# Patient Record
Sex: FEMALE | NOT HISPANIC OR LATINO
[De-identification: names, ages, dates, MRNs, and addresses within clinical notes are randomized per-mention and may not be internally consistent; named-entity substitution may affect disease eponyms.]

---

## 2024-04-01 PROBLEM — Z00.00 ENCOUNTER FOR PREVENTIVE HEALTH EXAMINATION: Status: ACTIVE | Noted: 2024-04-01

## 2024-04-09 ENCOUNTER — NON-APPOINTMENT (OUTPATIENT)
Age: 29
End: 2024-04-09

## 2024-04-10 ENCOUNTER — APPOINTMENT (OUTPATIENT)
Dept: OBGYN | Facility: CLINIC | Age: 29
End: 2024-04-10
Payer: COMMERCIAL

## 2024-04-10 VITALS
SYSTOLIC BLOOD PRESSURE: 131 MMHG | BODY MASS INDEX: 28.55 KG/M2 | DIASTOLIC BLOOD PRESSURE: 77 MMHG | HEIGHT: 63 IN | WEIGHT: 161.13 LBS

## 2024-04-10 DIAGNOSIS — N64.59 OTHER SIGNS AND SYMPTOMS IN BREAST: ICD-10-CM

## 2024-04-10 DIAGNOSIS — O35.EXX0 MATERNAL CARE FOR OTHER (SUSPECTED) FETAL ABNORMALITY AND DAMAGE, FETAL GENITOURINARY ANOMALIES, NOT APPLICABLE OR UNSPECIFIED: ICD-10-CM

## 2024-04-10 DIAGNOSIS — Z34.92 ENCOUNTER FOR SUPERVISION OF NORMAL PREGNANCY, UNSPECIFIED, SECOND TRIMESTER: ICD-10-CM

## 2024-04-10 LAB — TSH SERPL-ACNC: 1.26 UIU/ML

## 2024-04-10 PROCEDURE — 0500F INITIAL PRENATAL CARE VISIT: CPT

## 2024-04-11 LAB
ABO + RH PNL BLD: NORMAL
BASOPHILS # BLD AUTO: 0.04 K/UL
BASOPHILS NFR BLD AUTO: 0.5 %
BLD GP AB SCN SERPL QL: NORMAL
C TRACH RRNA SPEC QL NAA+PROBE: NOT DETECTED
EOSINOPHIL # BLD AUTO: 0.11 K/UL
EOSINOPHIL NFR BLD AUTO: 1.2 %
HBV SURFACE AG SER QL: NONREACTIVE
HCT VFR BLD CALC: 40.7 %
HGB A MFR BLD: 96.9 %
HGB A2 MFR BLD: 2.7 %
HGB BLD-MCNC: 12.5 G/DL
HGB F MFR BLD: 0.4 %
HGB FRACT BLD-IMP: NORMAL
HIV1+2 AB SPEC QL IA.RAPID: NONREACTIVE
IMM GRANULOCYTES NFR BLD AUTO: 0.5 %
LYMPHOCYTES # BLD AUTO: 1.5 K/UL
LYMPHOCYTES NFR BLD AUTO: 16.9 %
MAN DIFF?: NORMAL
MCHC RBC-ENTMCNC: 29.8 PG
MCHC RBC-ENTMCNC: 30.7 GM/DL
MCV RBC AUTO: 97.1 FL
MEV IGG FLD QL IA: 44.8 AU/ML
MEV IGG+IGM SER-IMP: POSITIVE
MONOCYTES # BLD AUTO: 0.54 K/UL
MONOCYTES NFR BLD AUTO: 6.1 %
N GONORRHOEA RRNA SPEC QL NAA+PROBE: NOT DETECTED
NEUTROPHILS # BLD AUTO: 6.62 K/UL
NEUTROPHILS NFR BLD AUTO: 74.8 %
PLATELET # BLD AUTO: 276 K/UL
RBC # BLD: 4.19 M/UL
RBC # FLD: 13.5 %
RUBV IGG FLD-ACNC: 1.4 INDEX
RUBV IGG SER-IMP: POSITIVE
SOURCE AMPLIFICATION: NORMAL
T PALLIDUM AB SER QL IA: NEGATIVE
VZV AB TITR SER: POSITIVE
VZV IGG SER IF-ACNC: >4000 INDEX
WBC # FLD AUTO: 8.85 K/UL

## 2024-04-13 LAB — BACTERIA UR CULT: ABNORMAL

## 2024-04-16 LAB — LEAD BLD-MCNC: <1 UG/DL

## 2024-04-29 ENCOUNTER — TRANSCRIPTION ENCOUNTER (OUTPATIENT)
Age: 29
End: 2024-04-29

## 2024-04-30 ENCOUNTER — NON-APPOINTMENT (OUTPATIENT)
Age: 29
End: 2024-04-30

## 2024-04-30 DIAGNOSIS — R73.09 OTHER ABNORMAL GLUCOSE: ICD-10-CM

## 2024-04-30 LAB — GLUCOSE 1H P 50 G GLC PO SERPL-MCNC: 145 MG/DL

## 2024-05-06 ENCOUNTER — APPOINTMENT (OUTPATIENT)
Dept: OBGYN | Facility: CLINIC | Age: 29
End: 2024-05-06
Payer: COMMERCIAL

## 2024-05-06 VITALS
SYSTOLIC BLOOD PRESSURE: 111 MMHG | WEIGHT: 162.38 LBS | DIASTOLIC BLOOD PRESSURE: 74 MMHG | BODY MASS INDEX: 28.76 KG/M2

## 2024-05-06 PROCEDURE — 90471 IMMUNIZATION ADMIN: CPT

## 2024-05-06 PROCEDURE — 0502F SUBSEQUENT PRENATAL CARE: CPT

## 2024-05-06 PROCEDURE — 90715 TDAP VACCINE 7 YRS/> IM: CPT

## 2024-05-13 ENCOUNTER — APPOINTMENT (OUTPATIENT)
Dept: ANTEPARTUM | Facility: CLINIC | Age: 29
End: 2024-05-13
Payer: COMMERCIAL

## 2024-05-13 ENCOUNTER — ASOB RESULT (OUTPATIENT)
Age: 29
End: 2024-05-13

## 2024-05-13 PROCEDURE — 76805 OB US >/= 14 WKS SNGL FETUS: CPT

## 2024-05-14 ENCOUNTER — NON-APPOINTMENT (OUTPATIENT)
Age: 29
End: 2024-05-14

## 2024-05-17 ENCOUNTER — ASOB RESULT (OUTPATIENT)
Age: 29
End: 2024-05-17

## 2024-05-17 ENCOUNTER — APPOINTMENT (OUTPATIENT)
Dept: MATERNAL FETAL MEDICINE | Facility: CLINIC | Age: 29
End: 2024-05-17
Payer: COMMERCIAL

## 2024-05-17 PROCEDURE — G0109 DIAB MANAGE TRN IND/GROUP: CPT | Mod: 95

## 2024-05-17 RX ORDER — CHOLECALCIFEROL (VITAMIN D3) 10(400)/ML
DROPS ORAL
Qty: 400 | Refills: 2 | Status: ACTIVE | COMMUNITY
Start: 2024-05-17 | End: 1900-01-01

## 2024-05-17 RX ORDER — BLOOD-GLUCOSE METER
W/DEVICE KIT MISCELLANEOUS
Qty: 1 | Refills: 0 | Status: ACTIVE | COMMUNITY
Start: 2024-05-17 | End: 1900-01-01

## 2024-05-17 RX ORDER — BLOOD-GLUCOSE METER
KIT MISCELLANEOUS 4 TIMES DAILY
Qty: 2 | Refills: 2 | Status: ACTIVE | COMMUNITY
Start: 2024-05-17 | End: 1900-01-01

## 2024-05-17 RX ORDER — URINE ACETONE TEST STRIPS
STRIP MISCELLANEOUS
Qty: 1 | Refills: 0 | Status: ACTIVE | COMMUNITY
Start: 2024-05-17 | End: 1900-01-01

## 2024-05-20 ENCOUNTER — NON-APPOINTMENT (OUTPATIENT)
Age: 29
End: 2024-05-20

## 2024-05-21 ENCOUNTER — APPOINTMENT (OUTPATIENT)
Dept: OBGYN | Facility: CLINIC | Age: 29
End: 2024-05-21
Payer: COMMERCIAL

## 2024-05-21 VITALS
DIASTOLIC BLOOD PRESSURE: 71 MMHG | WEIGHT: 162.25 LBS | SYSTOLIC BLOOD PRESSURE: 106 MMHG | BODY MASS INDEX: 28.74 KG/M2

## 2024-05-21 PROCEDURE — 0502F SUBSEQUENT PRENATAL CARE: CPT

## 2024-05-24 ENCOUNTER — APPOINTMENT (OUTPATIENT)
Dept: MATERNAL FETAL MEDICINE | Facility: CLINIC | Age: 29
End: 2024-05-24
Payer: COMMERCIAL

## 2024-05-24 ENCOUNTER — ASOB RESULT (OUTPATIENT)
Age: 29
End: 2024-05-24

## 2024-05-24 PROCEDURE — G0108 DIAB MANAGE TRN  PER INDIV: CPT | Mod: 95

## 2024-05-28 RX ORDER — ELECTROLYTES/DEXTROSE
32G X 4 MM SOLUTION, ORAL ORAL
Qty: 1 | Refills: 0 | Status: ACTIVE | COMMUNITY
Start: 2024-05-24 | End: 1900-01-01

## 2024-05-28 RX ORDER — INSULIN HUMAN 100 [IU]/ML
100 INJECTION, SUSPENSION SUBCUTANEOUS
Qty: 1 | Refills: 0 | Status: ACTIVE | COMMUNITY
Start: 2024-05-24 | End: 1900-01-01

## 2024-05-28 RX ORDER — ISOPROPYL ALCOHOL 0.7 ML/ML
SWAB TOPICAL
Qty: 1 | Refills: 0 | Status: ACTIVE | COMMUNITY
Start: 2024-05-24 | End: 1900-01-01

## 2024-05-30 ENCOUNTER — NON-APPOINTMENT (OUTPATIENT)
Age: 29
End: 2024-05-30

## 2024-06-06 ENCOUNTER — NON-APPOINTMENT (OUTPATIENT)
Age: 29
End: 2024-06-06

## 2024-06-06 ENCOUNTER — APPOINTMENT (OUTPATIENT)
Dept: MATERNAL FETAL MEDICINE | Facility: CLINIC | Age: 29
End: 2024-06-06
Payer: COMMERCIAL

## 2024-06-06 ENCOUNTER — APPOINTMENT (OUTPATIENT)
Dept: OBGYN | Facility: CLINIC | Age: 29
End: 2024-06-06
Payer: COMMERCIAL

## 2024-06-06 ENCOUNTER — ASOB RESULT (OUTPATIENT)
Age: 29
End: 2024-06-06

## 2024-06-06 VITALS
DIASTOLIC BLOOD PRESSURE: 71 MMHG | WEIGHT: 163.5 LBS | SYSTOLIC BLOOD PRESSURE: 107 MMHG | BODY MASS INDEX: 28.97 KG/M2 | HEIGHT: 63 IN

## 2024-06-06 DIAGNOSIS — O24.419 GESTATIONAL DIABETES MELLITUS IN PREGNANCY, UNSPECIFIED CONTROL: ICD-10-CM

## 2024-06-06 PROCEDURE — G0108 DIAB MANAGE TRN  PER INDIV: CPT | Mod: 95

## 2024-06-06 PROCEDURE — 98960 EDU&TRN PT SELF-MGMT NQHP 1: CPT | Mod: 95

## 2024-06-06 PROCEDURE — 0502F SUBSEQUENT PRENATAL CARE: CPT

## 2024-06-06 RX ORDER — BLOOD-GLUCOSE,RECEIVER,CONT
EACH MISCELLANEOUS
Qty: 1 | Refills: 0 | Status: ACTIVE | COMMUNITY
Start: 2024-06-06 | End: 1900-01-01

## 2024-06-06 RX ORDER — BLOOD-GLUCOSE SENSOR
EACH MISCELLANEOUS
Qty: 3 | Refills: 3 | Status: ACTIVE | COMMUNITY
Start: 2024-06-06 | End: 1900-01-01

## 2024-06-12 ENCOUNTER — ASOB RESULT (OUTPATIENT)
Age: 29
End: 2024-06-12

## 2024-06-12 ENCOUNTER — APPOINTMENT (OUTPATIENT)
Dept: ANTEPARTUM | Facility: CLINIC | Age: 29
End: 2024-06-12
Payer: COMMERCIAL

## 2024-06-12 PROCEDURE — 76816 OB US FOLLOW-UP PER FETUS: CPT

## 2024-06-12 PROCEDURE — 76819 FETAL BIOPHYS PROFIL W/O NST: CPT | Mod: 59

## 2024-06-18 ENCOUNTER — NON-APPOINTMENT (OUTPATIENT)
Age: 29
End: 2024-06-18

## 2024-06-19 ENCOUNTER — APPOINTMENT (OUTPATIENT)
Dept: OBGYN | Facility: CLINIC | Age: 29
End: 2024-06-19
Payer: COMMERCIAL

## 2024-06-19 VITALS
SYSTOLIC BLOOD PRESSURE: 111 MMHG | DIASTOLIC BLOOD PRESSURE: 79 MMHG | HEIGHT: 63 IN | WEIGHT: 165.5 LBS | BODY MASS INDEX: 29.32 KG/M2

## 2024-06-19 DIAGNOSIS — Z34.93 ENCOUNTER FOR SUPERVISION OF NORMAL PREGNANCY, UNSPECIFIED, THIRD TRIMESTER: ICD-10-CM

## 2024-06-19 PROCEDURE — 0502F SUBSEQUENT PRENATAL CARE: CPT

## 2024-06-20 ENCOUNTER — ASOB RESULT (OUTPATIENT)
Age: 29
End: 2024-06-20

## 2024-06-20 ENCOUNTER — APPOINTMENT (OUTPATIENT)
Dept: MATERNAL FETAL MEDICINE | Facility: CLINIC | Age: 29
End: 2024-06-20
Payer: COMMERCIAL

## 2024-06-20 PROCEDURE — 98960 EDU&TRN PT SELF-MGMT NQHP 1: CPT | Mod: 95

## 2024-06-20 PROCEDURE — G0108 DIAB MANAGE TRN  PER INDIV: CPT | Mod: 95

## 2024-07-02 ENCOUNTER — NON-APPOINTMENT (OUTPATIENT)
Age: 29
End: 2024-07-02

## 2024-07-02 ENCOUNTER — ASOB RESULT (OUTPATIENT)
Age: 29
End: 2024-07-02

## 2024-07-02 ENCOUNTER — APPOINTMENT (OUTPATIENT)
Dept: ANTEPARTUM | Facility: CLINIC | Age: 29
End: 2024-07-02
Payer: COMMERCIAL

## 2024-07-02 ENCOUNTER — APPOINTMENT (OUTPATIENT)
Dept: OBGYN | Facility: CLINIC | Age: 29
End: 2024-07-02
Payer: COMMERCIAL

## 2024-07-02 VITALS
HEIGHT: 63 IN | SYSTOLIC BLOOD PRESSURE: 109 MMHG | DIASTOLIC BLOOD PRESSURE: 75 MMHG | BODY MASS INDEX: 29.44 KG/M2 | WEIGHT: 166.13 LBS

## 2024-07-02 PROCEDURE — 81003 URINALYSIS AUTO W/O SCOPE: CPT | Mod: NC,QW

## 2024-07-02 PROCEDURE — 76819 FETAL BIOPHYS PROFIL W/O NST: CPT | Mod: 59

## 2024-07-02 PROCEDURE — 76816 OB US FOLLOW-UP PER FETUS: CPT

## 2024-07-02 PROCEDURE — 0502F SUBSEQUENT PRENATAL CARE: CPT

## 2024-07-05 ENCOUNTER — ASOB RESULT (OUTPATIENT)
Age: 29
End: 2024-07-05

## 2024-07-05 ENCOUNTER — APPOINTMENT (OUTPATIENT)
Dept: MATERNAL FETAL MEDICINE | Facility: CLINIC | Age: 29
End: 2024-07-05
Payer: COMMERCIAL

## 2024-07-05 LAB
GP B STREP DNA SPEC QL NAA+PROBE: NOT DETECTED
SOURCE GBS: NORMAL

## 2024-07-05 PROCEDURE — G0108 DIAB MANAGE TRN  PER INDIV: CPT | Mod: 95

## 2024-07-05 PROCEDURE — 98960 EDU&TRN PT SELF-MGMT NQHP 1: CPT | Mod: 95

## 2024-07-09 ENCOUNTER — APPOINTMENT (OUTPATIENT)
Dept: OBGYN | Facility: CLINIC | Age: 29
End: 2024-07-09
Payer: COMMERCIAL

## 2024-07-09 ENCOUNTER — NON-APPOINTMENT (OUTPATIENT)
Age: 29
End: 2024-07-09

## 2024-07-09 DIAGNOSIS — Z34.93 ENCOUNTER FOR SUPERVISION OF NORMAL PREGNANCY, UNSPECIFIED, THIRD TRIMESTER: ICD-10-CM

## 2024-07-09 PROCEDURE — 0502F SUBSEQUENT PRENATAL CARE: CPT

## 2024-07-16 ENCOUNTER — LABORATORY RESULT (OUTPATIENT)
Age: 29
End: 2024-07-16

## 2024-07-16 ENCOUNTER — APPOINTMENT (OUTPATIENT)
Dept: OBGYN | Facility: CLINIC | Age: 29
End: 2024-07-16
Payer: COMMERCIAL

## 2024-07-16 VITALS — DIASTOLIC BLOOD PRESSURE: 70 MMHG | WEIGHT: 163.8 LBS | SYSTOLIC BLOOD PRESSURE: 112 MMHG | BODY MASS INDEX: 29.02 KG/M2

## 2024-07-16 DIAGNOSIS — S03.40XA SPRAIN OF JAW, UNSPECIFIED SIDE, INITIAL ENCOUNTER: ICD-10-CM

## 2024-07-16 PROCEDURE — 0502F SUBSEQUENT PRENATAL CARE: CPT

## 2024-07-19 ENCOUNTER — NON-APPOINTMENT (OUTPATIENT)
Age: 29
End: 2024-07-19

## 2024-07-22 ENCOUNTER — ASOB RESULT (OUTPATIENT)
Age: 29
End: 2024-07-22

## 2024-07-22 ENCOUNTER — APPOINTMENT (OUTPATIENT)
Dept: ANTEPARTUM | Facility: CLINIC | Age: 29
End: 2024-07-22
Payer: COMMERCIAL

## 2024-07-22 ENCOUNTER — APPOINTMENT (OUTPATIENT)
Dept: OBGYN | Facility: CLINIC | Age: 29
End: 2024-07-22
Payer: COMMERCIAL

## 2024-07-22 VITALS
BODY MASS INDEX: 29.26 KG/M2 | HEIGHT: 63 IN | WEIGHT: 165.13 LBS | DIASTOLIC BLOOD PRESSURE: 62 MMHG | SYSTOLIC BLOOD PRESSURE: 103 MMHG

## 2024-07-22 PROCEDURE — 76816 OB US FOLLOW-UP PER FETUS: CPT

## 2024-07-22 PROCEDURE — 0502F SUBSEQUENT PRENATAL CARE: CPT

## 2024-07-26 ENCOUNTER — NON-APPOINTMENT (OUTPATIENT)
Age: 29
End: 2024-07-26

## 2024-07-28 ENCOUNTER — INPATIENT (INPATIENT)
Facility: HOSPITAL | Age: 29
LOS: 3 days | Discharge: HOME | DRG: 951 | End: 2024-08-01
Attending: OBSTETRICS & GYNECOLOGY | Admitting: OBSTETRICS & GYNECOLOGY
Payer: COMMERCIAL

## 2024-07-28 VITALS
TEMPERATURE: 99 F | OXYGEN SATURATION: 97 % | SYSTOLIC BLOOD PRESSURE: 122 MMHG | DIASTOLIC BLOOD PRESSURE: 74 MMHG | HEART RATE: 79 BPM | RESPIRATION RATE: 18 BRPM

## 2024-07-28 DIAGNOSIS — O24.419 GESTATIONAL DIABETES MELLITUS IN PREGNANCY, UNSPECIFIED CONTROL: ICD-10-CM

## 2024-07-28 DIAGNOSIS — Z34.93 ENCOUNTER FOR SUPERVISION OF NORMAL PREGNANCY, UNSPECIFIED, THIRD TRIMESTER: ICD-10-CM

## 2024-07-28 LAB
BASOPHILS # BLD AUTO: 0.03 K/UL — SIGNIFICANT CHANGE UP (ref 0–0.2)
BASOPHILS NFR BLD AUTO: 0.5 % — SIGNIFICANT CHANGE UP (ref 0–2)
BLD GP AB SCN SERPL QL: NEGATIVE — SIGNIFICANT CHANGE UP
EOSINOPHIL # BLD AUTO: 0.08 K/UL — SIGNIFICANT CHANGE UP (ref 0–0.5)
EOSINOPHIL NFR BLD AUTO: 1.3 % — SIGNIFICANT CHANGE UP (ref 0–6)
GLUCOSE BLDC GLUCOMTR-MCNC: 127 MG/DL — HIGH (ref 70–99)
GLUCOSE BLDC GLUCOMTR-MCNC: 98 MG/DL — SIGNIFICANT CHANGE UP (ref 70–99)
HCT VFR BLD CALC: 38.2 % — SIGNIFICANT CHANGE UP (ref 34.5–45)
HGB BLD-MCNC: 12.5 G/DL — SIGNIFICANT CHANGE UP (ref 11.5–15.5)
IMM GRANULOCYTES NFR BLD AUTO: 0.3 % — SIGNIFICANT CHANGE UP (ref 0–0.9)
LYMPHOCYTES # BLD AUTO: 1.54 K/UL — SIGNIFICANT CHANGE UP (ref 1–3.3)
LYMPHOCYTES # BLD AUTO: 24.2 % — SIGNIFICANT CHANGE UP (ref 13–44)
MCHC RBC-ENTMCNC: 28.3 PG — SIGNIFICANT CHANGE UP (ref 27–34)
MCHC RBC-ENTMCNC: 32.7 GM/DL — SIGNIFICANT CHANGE UP (ref 32–36)
MCV RBC AUTO: 86.4 FL — SIGNIFICANT CHANGE UP (ref 80–100)
MONOCYTES # BLD AUTO: 0.5 K/UL — SIGNIFICANT CHANGE UP (ref 0–0.9)
MONOCYTES NFR BLD AUTO: 7.9 % — SIGNIFICANT CHANGE UP (ref 2–14)
NEUTROPHILS # BLD AUTO: 4.19 K/UL — SIGNIFICANT CHANGE UP (ref 1.8–7.4)
NEUTROPHILS NFR BLD AUTO: 65.8 % — SIGNIFICANT CHANGE UP (ref 43–77)
NRBC # BLD: 0 /100 WBCS — SIGNIFICANT CHANGE UP (ref 0–0)
PLATELET # BLD AUTO: 273 K/UL — SIGNIFICANT CHANGE UP (ref 150–400)
RBC # BLD: 4.42 M/UL — SIGNIFICANT CHANGE UP (ref 3.8–5.2)
RBC # FLD: 12.3 % — SIGNIFICANT CHANGE UP (ref 10.3–14.5)
RH IG SCN BLD-IMP: POSITIVE — SIGNIFICANT CHANGE UP
RH IG SCN BLD-IMP: POSITIVE — SIGNIFICANT CHANGE UP
WBC # BLD: 6.36 K/UL — SIGNIFICANT CHANGE UP (ref 3.8–10.5)
WBC # FLD AUTO: 6.36 K/UL — SIGNIFICANT CHANGE UP (ref 3.8–10.5)

## 2024-07-28 RX ORDER — DEXTROSE MONOHYDRATE, SODIUM CHLORIDE, SODIUM LACTATE, CALCIUM CHLORIDE, MAGNESIUM CHLORIDE 1.5; 538; 448; 18.4; 5.08 G/100ML; MG/100ML; MG/100ML; MG/100ML; MG/100ML
1000 SOLUTION INTRAPERITONEAL
Refills: 0 | Status: DISCONTINUED | OUTPATIENT
Start: 2024-07-28 | End: 2024-07-30

## 2024-07-28 RX ORDER — TRISODIUM CITRATE DIHYDRATE AND CITRIC ACID MONOHYDRATE 500; 334 MG/5ML; MG/5ML
15 SOLUTION ORAL EVERY 6 HOURS
Refills: 0 | Status: DISCONTINUED | OUTPATIENT
Start: 2024-07-28 | End: 2024-07-30

## 2024-07-28 RX ORDER — OXYTOCIN/RINGER'S LACTATE 20/1000 ML
333.33 PLASTIC BAG, INJECTION (ML) INTRAVENOUS
Qty: 20 | Refills: 0 | Status: COMPLETED | OUTPATIENT
Start: 2024-07-28 | End: 2024-07-29

## 2024-07-28 RX ORDER — CHLORHEXIDINE GLUCONATE 500 MG/1
1 CLOTH TOPICAL DAILY
Refills: 0 | Status: DISCONTINUED | OUTPATIENT
Start: 2024-07-28 | End: 2024-07-30

## 2024-07-28 RX ORDER — BACTERIOSTATIC SODIUM CHLORIDE 0.9 %
1000 VIAL (ML) INJECTION
Refills: 0 | Status: DISCONTINUED | OUTPATIENT
Start: 2024-07-28 | End: 2024-07-30

## 2024-07-28 RX ADMIN — Medication 125 MILLILITER(S): at 20:24

## 2024-07-28 RX ADMIN — DEXTROSE MONOHYDRATE, SODIUM CHLORIDE, SODIUM LACTATE, CALCIUM CHLORIDE, MAGNESIUM CHLORIDE 125 MILLILITER(S): 1.5; 538; 448; 18.4; 5.08 SOLUTION INTRAPERITONEAL at 20:24

## 2024-07-28 NOTE — OB PROVIDER H&P - HISTORY OF PRESENT ILLNESS
28y  at 40 weeks GA who presents to L&D for IOL for GDMA1. Patient denies vaginal bleeding, contractions and leakage of fluid. She endorses good fetal movement. Denies chest pain, SOB. No other complaints at this time.   ALEXSANDRA:   LMP: _  Prenatal course is significant for:  Prenatal course uncomplicated.      POB:  PGYN: -fibroids, -ovarian cysts, denies STD hx, denies abnormal PAPs   PMH: Denies  PSH: Denies  SH: Denies EtOH, tobacco and illicit drug use during this pregnancy; feels safe at home   Meds: PNVs  Allergies: NKDA    BMI:  Sono:  EFW:     T(C): 37.1 (24 @ 19:47), Max: 37.1 (24 @ 19:14)  HR: 73 (24 @ 21:24) (67 - 85)  BP: 122/74 (24 @ 19:47) (122/74 - 122/74)  RR: 18 (24 @ 19:47) (18 - 20)  SpO2: 97% (24 @ 21:24) (92% - 100%)    Gen: NAD, well-appearing, AAOx3   Abd: Soft, gravid  Ext: non-tender, non-edematous  SSE:   SVE:    Bedside sono:  FHT:  [place]:       A/P:   -Admit to L&D  -Consent  -Admission labs  -NPO, except ice chips   -IV fluids  -Labor: Intact/*ROM. Latent/Active labor. [place] *.   -Fetus: Cat I tracing. Continuous toco and fetal monitoring.   -GBS: Negative, no GBS ppx required   -Analgesia:     Discussed with  _ 28y  at 40 weeks GA who presents to L&D for IOL for GDMA1. Patient denies vaginal bleeding, contractions and leakage of fluid. She endorses good fetal movement. Denies chest pain, SOB. No other complaints at this time.     ALEXSANDRA:     Prenatal course is significant forGDMA1, GBSneg, EFW 3800g, fetal B/L pyelectasis (resolved)    POB:Denies  PGYN: -fibroids, -ovarian cysts, denies STD hx, denies abnormal PAPs   PMH: Denies  PSH: Childhood lip surgery  SH: Denies EtOH, tobacco and illicit drug use during this pregnancy  Meds: PNVs  Allergies: NKDA

## 2024-07-28 NOTE — OB PROVIDER H&P - ASSESSMENT
28y  at 40 weeks GA who presents to L&D for IOL for GDMA1    A/P:   -Admit to L&D  -Admission labs  -NPO  -Labor: Intact, 2/60/-2, irritability on toco  -Fetus: Cat I tracing. Continuous toco and fetal monitoring.   -GBS: Negative, no GBS ppx required   -Analgesia: Epi PRN  -Will plan for IOL with buccal cytotec and balloon    Discussed with Dr. Donnie Ceron, PGY-1   28y  at 40 weeks GA who presents to L&D for IOL for GDMA1    A/P:   -Admit to L&D  -Admission labs  -NPO  -Labor: Intact, 2/60/-2, irritability on toco  -Fetus: Cat I tracing. Continuous toco and fetal monitoring.   -GBS: Negative, no GBS ppx required   -Analgesia: Epi PRN  -Will plan for IOL with buccal cytotec and balloon    Discussed with Dr. Donnie Ceron, PGY-1    ATTyo P0 admitted for induction of labor@40wks GA secondary to well controlled GDM-diet.  EFW 3800g, Cat I FHR tracing  Cytotec for cervical ripening  Epidural PRN  Follow for dilatation/descent

## 2024-07-28 NOTE — OB PROVIDER H&P - NSHPPHYSICALEXAM_GEN_ALL_CORE
T(C): 37.1 (07-28-24 @ 19:47), Max: 37.1 (07-28-24 @ 19:14)  HR: 73 (07-28-24 @ 21:24) (67 - 85)  BP: 122/74 (07-28-24 @ 19:47) (122/74 - 122/74)  RR: 18 (07-28-24 @ 19:47) (18 - 20)  SpO2: 97% (07-28-24 @ 21:24) (92% - 100%)    Gen: NAD, well-appearing, AAOx3   Abd: Soft, gravid  Ext: non-tender, non-edematous    SVE:  2/60/-2  Bedside sono: vertex  FHT: Baseline 140, mod variability, +accels, -decels  [place]: irritable

## 2024-07-28 NOTE — OB RN PATIENT PROFILE - FALL HARM RISK - UNIVERSAL INTERVENTIONS
Bed in lowest position, wheels locked, appropriate side rails in place/Call bell, personal items and telephone in reach/Instruct patient to call for assistance before getting out of bed or chair/Non-slip footwear when patient is out of bed/[place] to call system/Physically safe environment - no spills, clutter or unnecessary equipment/Purposeful Proactive Rounding/Room/bathroom lighting operational, light cord in reach

## 2024-07-29 LAB
ADD ON TEST-SPECIMEN IN LAB: SIGNIFICANT CHANGE UP
GLUCOSE BLDC GLUCOMTR-MCNC: 106 MG/DL — HIGH (ref 70–99)
GLUCOSE BLDC GLUCOMTR-MCNC: 109 MG/DL — HIGH (ref 70–99)
GLUCOSE BLDC GLUCOMTR-MCNC: 82 MG/DL — SIGNIFICANT CHANGE UP (ref 70–99)
GLUCOSE BLDC GLUCOMTR-MCNC: 89 MG/DL — SIGNIFICANT CHANGE UP (ref 70–99)
GLUCOSE BLDC GLUCOMTR-MCNC: 90 MG/DL — SIGNIFICANT CHANGE UP (ref 70–99)
GLUCOSE BLDC GLUCOMTR-MCNC: 94 MG/DL — SIGNIFICANT CHANGE UP (ref 70–99)
GLUCOSE BLDC GLUCOMTR-MCNC: 97 MG/DL — SIGNIFICANT CHANGE UP (ref 70–99)
GLUCOSE BLDC GLUCOMTR-MCNC: 98 MG/DL — SIGNIFICANT CHANGE UP (ref 70–99)
HCV AB S/CO SERPL IA: 0.19 S/CO — SIGNIFICANT CHANGE UP (ref 0–0.99)
HCV AB SERPL-IMP: SIGNIFICANT CHANGE UP
T PALLIDUM AB TITR SER: NEGATIVE — SIGNIFICANT CHANGE UP

## 2024-07-29 RX ORDER — OXYTOCIN/RINGER'S LACTATE 20/1000 ML
4 PLASTIC BAG, INJECTION (ML) INTRAVENOUS
Qty: 30 | Refills: 0 | Status: DISCONTINUED | OUTPATIENT
Start: 2024-07-29 | End: 2024-08-01

## 2024-07-29 RX ORDER — BACTERIOSTATIC SODIUM CHLORIDE 0.9 %
1000 VIAL (ML) INJECTION ONCE
Refills: 0 | Status: COMPLETED | OUTPATIENT
Start: 2024-07-29 | End: 2024-07-29

## 2024-07-29 RX ORDER — DEXTROSE MONOHYDRATE, SODIUM CHLORIDE, SODIUM LACTATE, CALCIUM CHLORIDE, MAGNESIUM CHLORIDE 1.5; 538; 448; 18.4; 5.08 G/100ML; MG/100ML; MG/100ML; MG/100ML; MG/100ML
1000 SOLUTION INTRAPERITONEAL ONCE
Refills: 0 | Status: DISCONTINUED | OUTPATIENT
Start: 2024-07-29 | End: 2024-08-01

## 2024-07-29 RX ORDER — BACTERIOSTATIC SODIUM CHLORIDE 0.9 %
500 VIAL (ML) INJECTION ONCE
Refills: 0 | Status: COMPLETED | OUTPATIENT
Start: 2024-07-29 | End: 2024-07-29

## 2024-07-29 RX ADMIN — Medication 125 MILLILITER(S): at 08:44

## 2024-07-29 RX ADMIN — Medication 2000 MILLILITER(S): at 19:40

## 2024-07-29 RX ADMIN — Medication 1000 MILLILITER(S): at 11:39

## 2024-07-29 RX ADMIN — Medication 125 MILLILITER(S): at 19:27

## 2024-07-29 RX ADMIN — Medication 4 MILLIUNIT(S)/MIN: at 08:35

## 2024-07-29 RX ADMIN — DEXTROSE MONOHYDRATE, SODIUM CHLORIDE, SODIUM LACTATE, CALCIUM CHLORIDE, MAGNESIUM CHLORIDE 125 MILLILITER(S): 1.5; 538; 448; 18.4; 5.08 SOLUTION INTRAPERITONEAL at 08:44

## 2024-07-29 NOTE — OB PROVIDER LABOR PROGRESS NOTE - NS_SUBJECTIVE/OBJECTIVE_OBGYN_ALL_OB_FT
Pt seen and examined at bedside for placement of cervical balloon. Pt comfortable and tolerated procedure well.

## 2024-07-29 NOTE — OB PROVIDER LABOR PROGRESS NOTE - NS_SUBJECTIVE/OBJECTIVE_OBGYN_ALL_OB_FT
VE performed in the setting of increased "vaginal pressure". No rectal pressure. Uncomfortable and tearful. Pit is at 16u.

## 2024-07-30 ENCOUNTER — NON-APPOINTMENT (OUTPATIENT)
Age: 29
End: 2024-07-30

## 2024-07-30 ENCOUNTER — APPOINTMENT (OUTPATIENT)
Dept: OBGYN | Facility: CLINIC | Age: 29
End: 2024-07-30

## 2024-07-30 LAB
GLUCOSE BLDC GLUCOMTR-MCNC: 111 MG/DL — HIGH (ref 70–99)
GLUCOSE BLDC GLUCOMTR-MCNC: 122 MG/DL — HIGH (ref 70–99)
GLUCOSE BLDC GLUCOMTR-MCNC: 89 MG/DL — SIGNIFICANT CHANGE UP (ref 70–99)

## 2024-07-30 PROCEDURE — 59400 OBSTETRICAL CARE: CPT | Mod: GC

## 2024-07-30 RX ORDER — OXYCODONE HYDROCHLORIDE 30 MG/1
5 TABLET ORAL ONCE
Refills: 0 | Status: DISCONTINUED | OUTPATIENT
Start: 2024-07-30 | End: 2024-08-01

## 2024-07-30 RX ORDER — IBUPROFEN 200 MG
600 TABLET ORAL EVERY 6 HOURS
Refills: 0 | Status: DISCONTINUED | OUTPATIENT
Start: 2024-07-30 | End: 2024-08-01

## 2024-07-30 RX ORDER — LANOLIN 100 %
1 OINTMENT (GRAM) TOPICAL EVERY 6 HOURS
Refills: 0 | Status: DISCONTINUED | OUTPATIENT
Start: 2024-07-30 | End: 2024-08-01

## 2024-07-30 RX ORDER — HYDROCORTISONE 1 %
1 CREAM (GRAM) TOPICAL EVERY 6 HOURS
Refills: 0 | Status: DISCONTINUED | OUTPATIENT
Start: 2024-07-30 | End: 2024-08-01

## 2024-07-30 RX ORDER — ONDANSETRON HCL/PF 4 MG/2 ML
4 VIAL (ML) INJECTION ONCE
Refills: 0 | Status: COMPLETED | OUTPATIENT
Start: 2024-07-30 | End: 2024-07-30

## 2024-07-30 RX ORDER — BACTERIOSTATIC SODIUM CHLORIDE 0.9 %
3 VIAL (ML) INJECTION EVERY 8 HOURS
Refills: 0 | Status: DISCONTINUED | OUTPATIENT
Start: 2024-07-30 | End: 2024-08-01

## 2024-07-30 RX ORDER — SIMETHICONE 125 MG/1
80 TABLET, CHEWABLE ORAL EVERY 4 HOURS
Refills: 0 | Status: DISCONTINUED | OUTPATIENT
Start: 2024-07-30 | End: 2024-08-01

## 2024-07-30 RX ORDER — IBUPROFEN 200 MG
600 TABLET ORAL EVERY 6 HOURS
Refills: 0 | Status: COMPLETED | OUTPATIENT
Start: 2024-07-30 | End: 2025-06-28

## 2024-07-30 RX ORDER — DIPHENHYDRAMINE HCL 25 MG
25 CAPSULE ORAL EVERY 6 HOURS
Refills: 0 | Status: DISCONTINUED | OUTPATIENT
Start: 2024-07-30 | End: 2024-08-01

## 2024-07-30 RX ORDER — ACETAMINOPHEN 500 MG
975 TABLET ORAL
Refills: 0 | Status: DISCONTINUED | OUTPATIENT
Start: 2024-07-30 | End: 2024-08-01

## 2024-07-30 RX ORDER — WITCH HAZEL 500 MG/1
1 CLOTH TOPICAL EVERY 4 HOURS
Refills: 0 | Status: DISCONTINUED | OUTPATIENT
Start: 2024-07-30 | End: 2024-08-01

## 2024-07-30 RX ORDER — MAGNESIUM HYDROXIDE 400 MG/5ML
30 SUSPENSION, ORAL (FINAL DOSE FORM) ORAL
Refills: 0 | Status: DISCONTINUED | OUTPATIENT
Start: 2024-07-30 | End: 2024-08-01

## 2024-07-30 RX ORDER — METHYLERGONOVINE MALEATE 0.2 MG
0.2 TABLET ORAL ONCE
Refills: 0 | Status: COMPLETED | OUTPATIENT
Start: 2024-07-30 | End: 2024-07-30

## 2024-07-30 RX ORDER — CRANBERRY FRUIT EXTRACT 650 MG
1 CAPSULE ORAL DAILY
Refills: 0 | Status: DISCONTINUED | OUTPATIENT
Start: 2024-07-30 | End: 2024-08-01

## 2024-07-30 RX ORDER — OXYCODONE HYDROCHLORIDE 30 MG/1
5 TABLET ORAL
Refills: 0 | Status: DISCONTINUED | OUTPATIENT
Start: 2024-07-30 | End: 2024-08-01

## 2024-07-30 RX ORDER — OXYTOCIN/RINGER'S LACTATE 20/1000 ML
41.67 PLASTIC BAG, INJECTION (ML) INTRAVENOUS
Qty: 20 | Refills: 0 | Status: DISCONTINUED | OUTPATIENT
Start: 2024-07-30 | End: 2024-08-01

## 2024-07-30 RX ORDER — OXYTOCIN/RINGER'S LACTATE 20/1000 ML
10 PLASTIC BAG, INJECTION (ML) INTRAVENOUS ONCE
Refills: 0 | Status: COMPLETED | OUTPATIENT
Start: 2024-07-30 | End: 2024-07-30

## 2024-07-30 RX ORDER — CLOSTRIDIUM TETANI TOXOID ANTIGEN (FORMALDEHYDE INACTIVATED), CORYNEBACTERIUM DIPHTHERIAE TOXOID ANTIGEN (FORMALDEHYDE INACTIVATED), BORDETELLA PERTUSSIS TOXOID ANTIGEN (GLUTARALDEHYDE INACTIVATED), BORDETELLA PERTUSSIS FILAMENTOUS HEMAGGLUTININ ANTIGEN (FORMALDEHYDE INACTIVATED), BORDETELLA PERTUSSIS PERTACTIN ANTIGEN, AND BORDETELLA PERTUSSIS FIMBRIAE 2/3 ANTIGEN 5; 2; 2.5; 5; 3; 5 [LF]/.5ML; [LF]/.5ML; UG/.5ML; UG/.5ML; UG/.5ML; UG/.5ML
0.5 INJECTION, SUSPENSION INTRAMUSCULAR ONCE
Refills: 0 | Status: DISCONTINUED | OUTPATIENT
Start: 2024-07-30 | End: 2024-08-01

## 2024-07-30 RX ORDER — DIBUCAINE 1 %
1 OINTMENT (GRAM) TOPICAL EVERY 6 HOURS
Refills: 0 | Status: DISCONTINUED | OUTPATIENT
Start: 2024-07-30 | End: 2024-08-01

## 2024-07-30 RX ORDER — METHYLERGONOVINE MALEATE 0.2 MG
0.2 TABLET ORAL ONCE
Refills: 0 | Status: DISCONTINUED | OUTPATIENT
Start: 2024-07-30 | End: 2024-07-30

## 2024-07-30 RX ORDER — KETOROLAC TROMETHAMINE 10 MG
30 TABLET ORAL ONCE
Refills: 0 | Status: DISCONTINUED | OUTPATIENT
Start: 2024-07-30 | End: 2024-07-30

## 2024-07-30 RX ADMIN — Medication 1000 MILLIUNIT(S)/MIN: at 06:10

## 2024-07-30 RX ADMIN — Medication 975 MILLIGRAM(S): at 21:08

## 2024-07-30 RX ADMIN — Medication 0.2 MILLIGRAM(S): at 05:25

## 2024-07-30 RX ADMIN — Medication 1 TABLET(S): at 12:02

## 2024-07-30 RX ADMIN — Medication 3 MILLILITER(S): at 18:00

## 2024-07-30 RX ADMIN — Medication 600 MILLIGRAM(S): at 23:49

## 2024-07-30 RX ADMIN — Medication 30 MILLIGRAM(S): at 07:45

## 2024-07-30 RX ADMIN — Medication 30 MILLIGRAM(S): at 07:02

## 2024-07-30 RX ADMIN — Medication 975 MILLIGRAM(S): at 21:38

## 2024-07-30 RX ADMIN — Medication 600 MILLIGRAM(S): at 12:33

## 2024-07-30 RX ADMIN — Medication 600 MILLIGRAM(S): at 12:03

## 2024-07-30 RX ADMIN — Medication 10 UNIT(S): at 05:11

## 2024-07-30 RX ADMIN — Medication 600 MILLIGRAM(S): at 18:17

## 2024-07-30 RX ADMIN — Medication 600 MILLIGRAM(S): at 17:47

## 2024-07-30 NOTE — DISCUSSION/SUMMARY
[FreeTextEntry1] : 29yo  s/p  on  after IOL A1GDM. "Karel" apgars 9,9. 2nd deg lac. Mild intermittent atony s/p rectal cytotec, IM pit and IM methergine, EBL 400cc

## 2024-07-30 NOTE — OB RN DELIVERY SUMMARY - NSSELHIDDEN_OBGYN_ALL_OB_FT
yes [NS_DeliveryAttending1_OBGYN_ALL_OB_FT:[email or web address]=],[NS_DeliveryAssist1_OBGYN_ALL_OB_FT:[id number]=],[NS_DeliveryRN_OBGYN_ALL_OB_FT:[email or web address]=]

## 2024-07-30 NOTE — OB PROVIDER LABOR PROGRESS NOTE - ASSESSMENT
- pt making cervical change  - now more comfortable with epidural   - c/w pitocin   - category I FHT     Nancy Vaughan, PGY4  d/w Dr. Rubi 
Now transitioning into active labor. For anesthesia topoff and peanut ball. Cat 1 tracing, anticipate .    Meghann RIOJAS
Cat 1 tracing  Continue to titrate pit  Okay for top off  Cont EFM/TOCO  Anticipate     D/w Dr. Elicia Padilla, PGY-2
OB attg note    29yo P0 at 40+1 here for IOL for A1GDM. Uncomplicated pregnancy. EFW on  3658g (69%). S/p cytotec and CB removed with gentle traction on this exam. Plan to start pitocin 4x4 and AROM on next exam. GBS neg. Anticipate , cat 1 tracing.    Meghann RIOJAS
AROMed for clear fluid, pit currently at 16. Continue pit titration. Cat 1 tracing, anticipate .    Meghann RIOJAS
-Continue with peanut ball  -Continue IV Pitocin  -Anticipate 
28y  at 40 weeks GA who presents to L&D for IOL for GDMA1.    -CB in place  -Fetus: category 1 tracing  -Will continue IOL with buccal cytotec    Plan per Dr. Donnie Ceron, PGY-1

## 2024-07-30 NOTE — OB PROVIDER DELIVERY SUMMARY - NSPROVIDERDELIVERYNOTE_OBGYN_ALL_OB_FT
Spontaneous vaginal delivery of liveborn infant from OA position. Head, shoulders and body were delivered easily. Infant was suctioned. No Mec. Delayed cord clamping performed. Cord was clamped and cut and infant was passed to mother. Placenta was delivered intact with 3 vessel cord. Fundal massage was given and uterine fundus was found to be intermittently atonic. Pitocin 10u IM and cytotec 1000mcg rectal given. Fundus found to be firm. Vaginal exam revealed an intact cervix, vaginal walls and sulci. Patient had a 2nd degree laceration in the perineum that was repaired with 2.0 vicryl rapide suture. Methergine 0.2mg IM given. Excellent hemostasis was noted. Patient stable. Count was correct x2.

## 2024-07-30 NOTE — OB RN DELIVERY SUMMARY - NS_SEPSISRSKCALC_OBGYN_ALL_OB_FT
EOS calculated successfully. EOS Risk Factor: 0.5/1000 live births ([facility] national incidence); GA=40w2d; Temp=100.22; ROM=18; GBS='Negative'; Antibiotics='No antibiotics or any antibiotics < 2 hrs prior to birth'

## 2024-07-30 NOTE — OB PROVIDER DELIVERY SUMMARY - NSLOWPPHRISK_OBGYN_A_OB
No previous uterine incision/[place] Pregnancy/Less than or equal to 4 previous vaginal births/No known bleeding disorder/No history of postpartum hemorrhage/No other PPH risks indicated

## 2024-07-30 NOTE — OB PROVIDER DELIVERY SUMMARY - NSSELHIDDEN_OBGYN_ALL_OB_FT
[NS_DeliveryAttending1_OBGYN_ALL_OB_FT:[email or web address]=],[NS_DeliveryAssist1_OBGYN_ALL_OB_FT:[id number]=]

## 2024-07-30 NOTE — OB PROVIDER LABOR PROGRESS NOTE - NS_SUBJECTIVE/OBJECTIVE_OBGYN_ALL_OB_FT
Patient seen and examined at bedside c/o increased rectal pressure. Patient seen and examined at bedside complaining of increased rectal pressure.

## 2024-07-30 NOTE — CHART NOTE - NSCHARTNOTEFT_GEN_A_CORE
Post-partum check in recovery     27yo now PPD #0 after  seen and examined at bedside. Patient doing well, pain well controlled. Denies current complaints. Ambulated and voided spontaneously. Denies CP, SOB, N/V, HA, blurred vision, epigastric pain.    Physical Exam:  General: NAD, resting comfortably in bed   Abdomen: Soft, non-tender, non-distended, fundus firm  Extremities: Full ROM, moving all extremities spontaneously, no edema  Pelvic: Lochia wnl    Assessment:   27yo now postpartum from , recovering well. Patient cleared to go to postpartum floors.     Lexy Branch, PGY-1

## 2024-07-30 NOTE — OB PROVIDER LABOR PROGRESS NOTE - NSVAGINALEXAM_OBGYN_ALL_OB_DT
29-Jul-2024 01:49
29-Jul-2024 11:10
29-Jul-2024 16:44
30-Jul-2024 00:19
30-Jul-2024 02:42
29-Jul-2024 08:42
29-Jul-2024 19:00

## 2024-07-31 RX ADMIN — Medication 600 MILLIGRAM(S): at 00:19

## 2024-07-31 RX ADMIN — Medication 975 MILLIGRAM(S): at 09:39

## 2024-07-31 RX ADMIN — Medication 975 MILLIGRAM(S): at 10:09

## 2024-07-31 RX ADMIN — Medication 600 MILLIGRAM(S): at 18:54

## 2024-07-31 RX ADMIN — Medication 1 TABLET(S): at 12:49

## 2024-07-31 RX ADMIN — Medication 975 MILLIGRAM(S): at 20:50

## 2024-07-31 RX ADMIN — Medication 600 MILLIGRAM(S): at 06:08

## 2024-07-31 RX ADMIN — Medication 600 MILLIGRAM(S): at 18:24

## 2024-07-31 RX ADMIN — Medication 600 MILLIGRAM(S): at 12:49

## 2024-07-31 RX ADMIN — Medication 600 MILLIGRAM(S): at 05:38

## 2024-07-31 RX ADMIN — Medication 975 MILLIGRAM(S): at 21:30

## 2024-07-31 RX ADMIN — Medication 600 MILLIGRAM(S): at 13:19

## 2024-07-31 NOTE — CHART NOTE - NSCHARTNOTEFT_GEN_A_CORE
Patient seen for: Nutrition consult for GDM postpartum education prior to discharge    Source: Patient, Spouse at Bedside, Electronic Medical Record    Patient is: ; GDMA1    Chart reviewed, events noted. Meds/Labs reviewed.    Anthropometrics:  Height: 5 feet 5 inches  Pre-pregnancy weight: 143 pounds   Weight gain: ~20 pounds   Admit/Dosing wt: 163.1 pounds     Nutrition Diagnosis:   Food and Nutrition Related Knowledge Deficit related to limited previous exposure to postpartum GDM nutrition education and recommendations as evidenced by GDM postpartum.    Goal: Pt to state at least two teach back points.    Intervention:  1. Education: Pt and spouse educated on postpartum dietary recommendations, including risk of development of T2DM; reinforced importance of DM screening 4-12 weeks postpartum. Reviewed nutrition recommendations for breast feeding, including adequate protein, calorie, and fluid intake.   2. Handout: "What to expect now that you have had your baby"     Monitoring:  No other nutrition risks were identified during this encounter.  RD remains available upon request and will follow up per protocol.    Selin South RDN, CDN  TEAMS Patient seen for: Nutrition consult for GDM postpartum education prior to discharge    Source: Patient, Spouse at Bedside, Electronic Medical Record    Patient is: ; GDMA1    Chart reviewed, events noted. Meds/Labs reviewed.    Anthropometrics:  Height: 5 feet 3 inches  Pre-pregnancy weight: 140 pounds   Weight gain: ~24 pounds   Admit/Dosing wt: 164 pounds     Nutrition Diagnosis:   Food and Nutrition Related Knowledge Deficit related to limited previous exposure to postpartum GDM nutrition education and recommendations as evidenced by GDM postpartum.    Goal: Pt to state at least two teach back points.    Intervention:  1. Education: Pt and spouse educated on postpartum dietary recommendations, including risk of development of T2DM; reinforced importance of DM screening 4-12 weeks postpartum. Reviewed nutrition recommendations for breast feeding, including adequate protein, calorie, and fluid intake.   2. Handout: "What to expect now that you have had your baby"     Monitoring:  No other nutrition risks were identified during this encounter.  RD remains available upon request and will follow up per protocol.    Selin South RDN, CDN  TEAMS

## 2024-07-31 NOTE — PROGRESS NOTE ADULT - ASSESSMENT
29yo PPD#1 s/p .  Patient is stable and doing well post-partum.     - Pain well controlled, continue current pain regimen  - Increase ambulation, SCDs when not ambulating  - Continue regular diet    Malgorzata Ceron, PGY-1    29yo PPD#1 s/p .  Patient is stable and doing well post-partum.     - Pain well controlled, continue current pain regimen  - Increase ambulation, SCDs when not ambulating  - Continue regular diet    Malgorzata Ceron, PGY-1      ATTG:  Pt seen and evaluated  Stable PPD#1 s/p   Routine PP Care  D/C planning for tomorrow

## 2024-07-31 NOTE — PROGRESS NOTE ADULT - SUBJECTIVE AND OBJECTIVE BOX
27yo  PPD#1 s/p . Patient feels well. Pain is well controlled, tolerating regular diet, passing flatus, voiding spontaneously, ambulating without difficulty. Denies heavy vaginal bleeding, CP/SOB, N/V, lightheadedness/dizziness.     O:  Vitals:  Vital Signs Last 24 Hrs  T(C): 36.7 (2024 20:48), Max: 37.7 (2024 12:05)  T(F): 98 (2024 20:48), Max: 99.8 (2024 12:05)  HR: 84 (2024 21:13) (71 - 108)  BP: 100/70 (2024 21:13) (89/60 - 137/74)  BP(mean): 89 (2024 07:30) (89 - 99)  RR: 18 (2024 20:48) (17 - 18)  SpO2: 100% (2024 20:48) (92% - 100%)    Parameters below as of 2024 20:48  Patient On (Oxygen Delivery Method): room air        MEDICATIONS  (STANDING):  acetaminophen     Tablet .. 975 milliGRAM(s) Oral <User Schedule>  diphtheria/tetanus/pertussis (acellular) Vaccine (Adacel) 0.5 milliLiter(s) IntraMuscular once  ibuprofen  Tablet. 600 milliGRAM(s) Oral every 6 hours  lactated ringers Bolus 1000 milliLiter(s) IV Bolus once  oxytocin Infusion 41.667 milliUNIT(s)/Min (125 mL/Hr) IV Continuous <Continuous>  oxytocin Infusion. 4 milliUNIT(s)/Min (4 mL/Hr) IV Continuous <Continuous>  prenatal multivitamin 1 Tablet(s) Oral daily  sodium chloride 0.9% lock flush 3 milliLiter(s) IV Push every 8 hours      MEDICATIONS  (PRN):  benzocaine 20%/menthol 0.5% Spray 1 Spray(s) Topical every 6 hours PRN for Perineal discomfort  dibucaine 1% Ointment 1 Application(s) Topical every 6 hours PRN Perineal discomfort  diphenhydrAMINE 25 milliGRAM(s) Oral every 6 hours PRN Pruritus  hydrocortisone 1% Cream 1 Application(s) Topical every 6 hours PRN Moderate Pain (4-6)  lanolin Ointment 1 Application(s) Topical every 6 hours PRN nipple soreness  magnesium hydroxide Suspension 30 milliLiter(s) Oral two times a day PRN Constipation  oxyCODONE    IR 5 milliGRAM(s) Oral every 3 hours PRN Moderate to Severe Pain (4-10)  oxyCODONE    IR 5 milliGRAM(s) Oral once PRN Moderate to Severe Pain (4-10)  pramoxine 1%/zinc 5% Cream 1 Application(s) Topical every 4 hours PRN Moderate Pain (4-6)  simethicone 80 milliGRAM(s) Chew every 4 hours PRN Gas  witch hazel Pads 1 Application(s) Topical every 4 hours PRN Perineal discomfort      Labs:  Blood type: O Positive  Rubella IgG: RPR: Negative                          12.5   6.36 >-----------< 273    (  @ 21:27 )             38.2        Physical Exam:  General: NAD  Abdomen: soft, non-tender, non-distended, fundus firm  Vaginal: No heavy vaginal bleeding  Extremities: No erythema/edema

## 2024-08-01 ENCOUNTER — TRANSCRIPTION ENCOUNTER (OUTPATIENT)
Age: 29
End: 2024-08-01

## 2024-08-01 VITALS
SYSTOLIC BLOOD PRESSURE: 103 MMHG | RESPIRATION RATE: 18 BRPM | DIASTOLIC BLOOD PRESSURE: 68 MMHG | HEART RATE: 73 BPM | TEMPERATURE: 98 F | OXYGEN SATURATION: 97 %

## 2024-08-01 PROCEDURE — 86780 TREPONEMA PALLIDUM: CPT

## 2024-08-01 PROCEDURE — 86850 RBC ANTIBODY SCREEN: CPT

## 2024-08-01 PROCEDURE — 85025 COMPLETE CBC W/AUTO DIFF WBC: CPT

## 2024-08-01 PROCEDURE — 59050 FETAL MONITOR W/REPORT: CPT

## 2024-08-01 PROCEDURE — 86901 BLOOD TYPING SEROLOGIC RH(D): CPT

## 2024-08-01 PROCEDURE — 82962 GLUCOSE BLOOD TEST: CPT

## 2024-08-01 PROCEDURE — 86900 BLOOD TYPING SEROLOGIC ABO: CPT

## 2024-08-01 PROCEDURE — 86803 HEPATITIS C AB TEST: CPT

## 2024-08-01 RX ORDER — IBUPROFEN 200 MG
1 TABLET ORAL
Qty: 0 | Refills: 0 | DISCHARGE
Start: 2024-08-01

## 2024-08-01 RX ORDER — ACETAMINOPHEN 500 MG
3 TABLET ORAL
Qty: 0 | Refills: 0 | DISCHARGE
Start: 2024-08-01

## 2024-08-01 RX ORDER — CRANBERRY FRUIT EXTRACT 650 MG
1 CAPSULE ORAL DAILY
Refills: 0 | Status: DISCONTINUED | OUTPATIENT
Start: 2024-08-01 | End: 2024-08-01

## 2024-08-01 RX ADMIN — Medication 600 MILLIGRAM(S): at 00:46

## 2024-08-01 RX ADMIN — Medication 975 MILLIGRAM(S): at 04:00

## 2024-08-01 RX ADMIN — Medication 975 MILLIGRAM(S): at 09:12

## 2024-08-01 RX ADMIN — Medication 975 MILLIGRAM(S): at 10:05

## 2024-08-01 RX ADMIN — Medication 600 MILLIGRAM(S): at 00:15

## 2024-08-01 RX ADMIN — Medication 600 MILLIGRAM(S): at 11:40

## 2024-08-01 RX ADMIN — Medication 1 TABLET(S): at 11:40

## 2024-08-01 RX ADMIN — Medication 600 MILLIGRAM(S): at 05:56

## 2024-08-01 RX ADMIN — Medication 600 MILLIGRAM(S): at 12:10

## 2024-08-01 RX ADMIN — Medication 975 MILLIGRAM(S): at 03:17

## 2024-08-01 NOTE — DISCHARGE NOTE OB - NS OB DC IMMUNIZATIONS2 YN
No
Bed in lowest position, wheels locked, appropriate side rails in place/Call bell, personal items and telephone in reach/Instruct patient to call for assistance before getting out of bed or chair/Non-slip footwear when patient is out of bed/[place] to call system/Physically safe environment - no spills, clutter or unnecessary equipment/Purposeful Proactive Rounding/Room/bathroom lighting operational, light cord in reach

## 2024-08-01 NOTE — PROGRESS NOTE ADULT - SUBJECTIVE AND OBJECTIVE BOX
Postpartum Note, PPD 2   She is a  28y woman who is now PPD 2    Subjective:  The patient feels well.  She is ambulating.   She is tolerating regular diet.  She denies nausea and vomiting.  She is voiding.  Her pain is controlled.  She reports normal postpartum bleeding.  She is breastfeeding.  She is formula feeding.    Physical exam:    Vital Signs Last 24 Hrs  T(C): 36.6 (01 Aug 2024 05:23), Max: 36.6 (01 Aug 2024 05:23)  T(F): 97.9 (01 Aug 2024 05:23), Max: 97.9 (01 Aug 2024 05:23)  HR: 73 (01 Aug 2024 05:23) (67 - 73)  BP: 103/68 (01 Aug 2024 05:23) (103/68 - 110/72)  BP(mean): --  RR: 18 (01 Aug 2024 05:23) (18 - 18)  SpO2: 97% (01 Aug 2024 05:23) (97% - 97%)    Parameters below as of 01 Aug 2024 05:23  Patient On (Oxygen Delivery Method): room air        Gen: NAD  Breast: Soft, nontender, not engorged.  Abdomen: Soft, nontender, no distension , firm uterine fundus at umbilicus.  Incision: Clean, dry, and intact with steri strips  Pelvic: Normal lochia noted  Ext: No calf tenderness    LABS:                  Allergies    No Known Allergies    Intolerances      MEDICATIONS  (STANDING):  acetaminophen     Tablet .. 975 milliGRAM(s) Oral <User Schedule>  diphtheria/tetanus/pertussis (acellular) Vaccine (Adacel) 0.5 milliLiter(s) IntraMuscular once  ibuprofen  Tablet. 600 milliGRAM(s) Oral every 6 hours  lactated ringers Bolus 1000 milliLiter(s) IV Bolus once  oxytocin Infusion 41.667 milliUNIT(s)/Min (125 mL/Hr) IV Continuous <Continuous>  oxytocin Infusion. 4 milliUNIT(s)/Min (4 mL/Hr) IV Continuous <Continuous>  prenatal multivitamin 1 Tablet(s) Oral daily  sodium chloride 0.9% lock flush 3 milliLiter(s) IV Push every 8 hours    MEDICATIONS  (PRN):  benzocaine 20%/menthol 0.5% Spray 1 Spray(s) Topical every 6 hours PRN for Perineal discomfort  dibucaine 1% Ointment 1 Application(s) Topical every 6 hours PRN Perineal discomfort  diphenhydrAMINE 25 milliGRAM(s) Oral every 6 hours PRN Pruritus  hydrocortisone 1% Cream 1 Application(s) Topical every 6 hours PRN Moderate Pain (4-6)  lanolin Ointment 1 Application(s) Topical every 6 hours PRN nipple soreness  magnesium hydroxide Suspension 30 milliLiter(s) Oral two times a day PRN Constipation  oxyCODONE    IR 5 milliGRAM(s) Oral once PRN Moderate to Severe Pain (4-10)  oxyCODONE    IR 5 milliGRAM(s) Oral every 3 hours PRN Moderate to Severe Pain (4-10)  pramoxine 1%/zinc 5% Cream 1 Application(s) Topical every 4 hours PRN Moderate Pain (4-6)  simethicone 80 milliGRAM(s) Chew every 4 hours PRN Gas  witch hazel Pads 1 Application(s) Topical every 4 hours PRN Perineal discomfort        Assessment and Plan  POD # s/p  section  Doing well.  Encourage ambulation.

## 2024-08-01 NOTE — DISCHARGE NOTE OB - NSDCQMCOGNITION_NEU_ALL_CORE
Left message on machine for patient to call back.     PSR: upon call back, please inform patient his Physician Results Form was faxed to [email or web address] and the original copy of the form has been mailed to his home.    No difficulties

## 2024-08-01 NOTE — DISCHARGE NOTE OB - MEDICATION SUMMARY - MEDICATIONS TO TAKE
I will START or STAY ON the medications listed below when I get home from the hospital:    ibuprofen 600 mg oral tablet  -- 1 tab(s) by mouth every 6 hours  -- Indication: For pain     acetaminophen 325 mg oral tablet  -- 3 tab(s) by mouth every 6 hours as needed for pain  -- Indication: For pain

## 2024-08-01 NOTE — DISCHARGE NOTE OB - CARE PROVIDER_API CALL
Heather Rubi  Obstetrics and Gynecology  [address], [address]  [place] [address]  Phone: [phone]  Fax: [phone]  Follow Up Time:

## 2024-08-01 NOTE — DISCHARGE NOTE OB - NS MD DC FALL RISK RISK
For information on Fall & Injury Prevention, visit: [email or web address] OR  [email or web address] OR  https://www.cdc.gov/steadi/patient.html

## 2024-08-01 NOTE — DISCHARGE NOTE OB - CARE PLAN
1 Principal Discharge DX:	Vaginal delivery  Assessment and plan of treatment:	Office visit in 4 weeks

## 2024-08-01 NOTE — DISCHARGE NOTE OB - HOSPITAL COURSE
patient underwent a normal vaginal delivery without complications  Routine post partum care  Meeting milestones  Cleared for discharge  Cristian

## 2024-08-01 NOTE — DISCHARGE NOTE OB - PATIENT PORTAL LINK FT
You can access the FollowMyHealth Patient Portal offered by [facility] by registering at the following website: [email or web address]. By joining [facility]’s FollowMyHealth portal, you will also be able to view your health information using other applications (apps) compatible with our system.

## 2024-08-20 ENCOUNTER — APPOINTMENT (OUTPATIENT)
Age: 29
End: 2024-08-20
Payer: COMMERCIAL

## 2024-08-20 PROCEDURE — S9443: CPT | Mod: 95

## 2024-09-10 ENCOUNTER — APPOINTMENT (OUTPATIENT)
Dept: OBGYN | Facility: CLINIC | Age: 29
End: 2024-09-10
Payer: COMMERCIAL

## 2024-09-10 VITALS — BODY MASS INDEX: 25.52 KG/M2 | WEIGHT: 144 LBS | HEIGHT: 63 IN

## 2024-09-10 DIAGNOSIS — Z34.92 ENCOUNTER FOR SUPERVISION OF NORMAL PREGNANCY, UNSPECIFIED, SECOND TRIMESTER: ICD-10-CM

## 2024-09-10 DIAGNOSIS — Z34.93 ENCOUNTER FOR SUPERVISION OF NORMAL PREGNANCY, UNSPECIFIED, THIRD TRIMESTER: ICD-10-CM

## 2024-09-10 DIAGNOSIS — O24.419 GESTATIONAL DIABETES MELLITUS IN PREGNANCY, UNSPECIFIED CONTROL: ICD-10-CM

## 2024-09-10 DIAGNOSIS — R73.09 OTHER ABNORMAL GLUCOSE: ICD-10-CM

## 2024-09-10 DIAGNOSIS — O35.EXX0 MATERNAL CARE FOR OTHER (SUSPECTED) FETAL ABNORMALITY AND DAMAGE, FETAL GENITOURINARY ANOMALIES, NOT APPLICABLE OR UNSPECIFIED: ICD-10-CM

## 2024-09-10 PROCEDURE — 96127 BRIEF EMOTIONAL/BEHAV ASSMT: CPT

## 2024-09-10 PROCEDURE — G0444 DEPRESSION SCREEN ANNUAL: CPT

## 2024-09-10 PROCEDURE — 0503F POSTPARTUM CARE VISIT: CPT

## 2024-09-10 RX ORDER — NORETHINDRONE 0.35 MG/1
0.35 TABLET ORAL
Qty: 3 | Refills: 3 | Status: ACTIVE | COMMUNITY
Start: 2024-09-10 | End: 1900-01-01

## 2024-09-10 NOTE — HISTORY OF PRESENT ILLNESS
[Postpartum Follow Up] : postpartum follow up [Delivery Date: ___] : on [unfilled] [] : delivered by vaginal delivery [Male] : Delivery History: baby boy [Breastfeeding] : currently nursing [S/Sx PP Depression] : no signs/symptoms of postpartum depression [Suicidal Ideation] : no suicidal ideation [Back to Normal] : is back to normal in size [None] : no vaginal bleeding [Normal] : the vagina was normal [Healing Well] : is healing well [Cervix Sample Taken] : cervical sample taken for a Pap smear [Examination Of The Breasts] : breasts are normal [Doing Well] : is doing well [FreeTextEntry8] : post partum follow up  [FreeTextEntry9] : GDM insulin dependent  [de-identified] : OCP  [FreeTextEntry1] : Doing well PP Will start mini pill and call when done breast feeding Episiotomy well healed  May begin relations after 2 weeks on OCP   Patient screened for depression. No signs of clinical depression. PHQ-9 scores reviewed over the course of the visit and 5-10 minutes of face to face time spent. Follow up with changes in mood including other symptoms of anxiety.

## 2024-09-17 ENCOUNTER — NON-APPOINTMENT (OUTPATIENT)
Age: 29
End: 2024-09-17

## 2024-09-17 LAB — CYTOLOGY CVX/VAG DOC THIN PREP: NORMAL
